# Patient Record
Sex: FEMALE | Race: WHITE | NOT HISPANIC OR LATINO | Employment: UNEMPLOYED | URBAN - NONMETROPOLITAN AREA
[De-identification: names, ages, dates, MRNs, and addresses within clinical notes are randomized per-mention and may not be internally consistent; named-entity substitution may affect disease eponyms.]

---

## 2017-10-02 ENCOUNTER — OFFICE VISIT (OUTPATIENT)
Dept: NEUROSURGERY | Facility: CLINIC | Age: 42
End: 2017-10-02

## 2017-10-02 ENCOUNTER — HOSPITAL ENCOUNTER (OUTPATIENT)
Dept: GENERAL RADIOLOGY | Facility: HOSPITAL | Age: 42
Discharge: HOME | End: 2017-10-02
Admitting: NURSE PRACTITIONER

## 2017-10-02 VITALS
BODY MASS INDEX: 34.55 KG/M2 | HEIGHT: 66 IN | DIASTOLIC BLOOD PRESSURE: 108 MMHG | SYSTOLIC BLOOD PRESSURE: 152 MMHG | WEIGHT: 215 LBS

## 2017-10-02 DIAGNOSIS — M50.30 DEGENERATION OF CERVICAL INTERVERTEBRAL DISC: ICD-10-CM

## 2017-10-02 DIAGNOSIS — M54.6 PAIN IN THORACIC SPINE: ICD-10-CM

## 2017-10-02 DIAGNOSIS — M43.16 SPONDYLOLISTHESIS, LUMBAR REGION: Primary | ICD-10-CM

## 2017-10-02 DIAGNOSIS — F17.200 SMOKER: ICD-10-CM

## 2017-10-02 PROCEDURE — 72052 X-RAY EXAM NECK SPINE 6/>VWS: CPT

## 2017-10-02 PROCEDURE — 72120 X-RAY BEND ONLY L-S SPINE: CPT

## 2017-10-02 PROCEDURE — 99204 OFFICE O/P NEW MOD 45 MIN: CPT | Performed by: NURSE PRACTITIONER

## 2017-10-02 RX ORDER — HYDROCODONE BITARTRATE AND ACETAMINOPHEN 10; 325 MG/1; MG/1
TABLET ORAL
COMMUNITY

## 2017-10-02 RX ORDER — DIAZEPAM 10 MG/1
TABLET ORAL
COMMUNITY

## 2017-10-02 RX ORDER — TRAMADOL HYDROCHLORIDE 50 MG/1
TABLET ORAL
COMMUNITY
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
## 2021-04-14 ENCOUNTER — OFFICE VISIT (OUTPATIENT)
Dept: NEUROSURGERY | Age: 46
End: 2021-04-14
Payer: MEDICAID

## 2021-04-14 VITALS
SYSTOLIC BLOOD PRESSURE: 112 MMHG | WEIGHT: 197 LBS | OXYGEN SATURATION: 97 % | DIASTOLIC BLOOD PRESSURE: 62 MMHG | BODY MASS INDEX: 34.91 KG/M2 | HEIGHT: 63 IN | HEART RATE: 103 BPM

## 2021-04-14 DIAGNOSIS — R53.1 WEAKNESS: ICD-10-CM

## 2021-04-14 DIAGNOSIS — R55 SYNCOPE, UNSPECIFIED SYNCOPE TYPE: ICD-10-CM

## 2021-04-14 DIAGNOSIS — M54.41 CHRONIC BILATERAL LOW BACK PAIN WITH BILATERAL SCIATICA: Primary | ICD-10-CM

## 2021-04-14 DIAGNOSIS — M54.42 CHRONIC BILATERAL LOW BACK PAIN WITH BILATERAL SCIATICA: Primary | ICD-10-CM

## 2021-04-14 DIAGNOSIS — G89.29 CHRONIC BILATERAL LOW BACK PAIN WITH BILATERAL SCIATICA: Primary | ICD-10-CM

## 2021-04-14 DIAGNOSIS — R27.0 ATAXIA: ICD-10-CM

## 2021-04-14 DIAGNOSIS — R51.9 HEADACHE, UNSPECIFIED HEADACHE TYPE: ICD-10-CM

## 2021-04-14 DIAGNOSIS — R20.0 NUMBNESS: ICD-10-CM

## 2021-04-14 PROCEDURE — G8417 CALC BMI ABV UP PARAM F/U: HCPCS | Performed by: PSYCHIATRY & NEUROLOGY

## 2021-04-14 PROCEDURE — 4004F PT TOBACCO SCREEN RCVD TLK: CPT | Performed by: PSYCHIATRY & NEUROLOGY

## 2021-04-14 PROCEDURE — G8427 DOCREV CUR MEDS BY ELIG CLIN: HCPCS | Performed by: PSYCHIATRY & NEUROLOGY

## 2021-04-14 PROCEDURE — 99205 OFFICE O/P NEW HI 60 MIN: CPT | Performed by: PSYCHIATRY & NEUROLOGY

## 2021-04-14 RX ORDER — FUROSEMIDE 20 MG/1
1 TABLET ORAL DAILY
COMMUNITY
Start: 2021-04-10

## 2021-04-14 RX ORDER — CARIPRAZINE 3 MG/1
1 CAPSULE, GELATIN COATED ORAL DAILY
COMMUNITY
Start: 2021-04-10
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
## 2021-04-15 ENCOUNTER — HOSPITAL ENCOUNTER (OUTPATIENT)
Dept: NON INVASIVE DIAGNOSTICS | Age: 46
Discharge: HOME | End: 2021-04-15
Payer: MEDICAID

## 2021-04-15 DIAGNOSIS — M54.41 CHRONIC BILATERAL LOW BACK PAIN WITH BILATERAL SCIATICA: ICD-10-CM

## 2021-04-15 DIAGNOSIS — R27.0 ATAXIA: ICD-10-CM

## 2021-04-15 DIAGNOSIS — R51.9 HEADACHE, UNSPECIFIED HEADACHE TYPE: ICD-10-CM

## 2021-04-15 DIAGNOSIS — R20.0 NUMBNESS: ICD-10-CM

## 2021-04-15 DIAGNOSIS — M54.42 CHRONIC BILATERAL LOW BACK PAIN WITH BILATERAL SCIATICA: ICD-10-CM

## 2021-04-15 DIAGNOSIS — G89.29 CHRONIC BILATERAL LOW BACK PAIN WITH BILATERAL SCIATICA: ICD-10-CM

## 2021-04-15 DIAGNOSIS — R55 SYNCOPE, UNSPECIFIED SYNCOPE TYPE: ICD-10-CM

## 2021-04-15 DIAGNOSIS — R53.1 WEAKNESS: ICD-10-CM

## 2021-04-15 LAB
ALBUMIN SERPL-MCNC: 4.1 G/DL (ref 3.5–5.2)
ALP BLD-CCNC: 113 U/L (ref 35–104)
ALT SERPL-CCNC: 31 U/L (ref 5–33)
ANION GAP SERPL CALCULATED.3IONS-SCNC: 10 MMOL/L (ref 7–19)
AST SERPL-CCNC: 23 U/L (ref 5–32)
BASOPHILS ABSOLUTE: 0.1 K/UL (ref 0–0.2)
BASOPHILS RELATIVE PERCENT: 0.5 % (ref 0–1)
BILIRUB SERPL-MCNC: <0.2 MG/DL (ref 0.2–1.2)
BUN BLDV-MCNC: 10 MG/DL (ref 6–20)
C-REACTIVE PROTEIN: 1.22 MG/DL (ref 0–0.5)
CALCIUM SERPL-MCNC: 9.9 MG/DL (ref 8.6–10)
CHLORIDE BLD-SCNC: 100 MMOL/L (ref 98–111)
CO2: 29 MMOL/L (ref 22–29)
CREAT SERPL-MCNC: 0.7 MG/DL (ref 0.5–0.9)
EOSINOPHILS ABSOLUTE: 0.2 K/UL (ref 0–0.6)
EOSINOPHILS RELATIVE PERCENT: 2.4 % (ref 0–5)
GFR AFRICAN AMERICAN: >59
GFR NON-AFRICAN AMERICAN: >60
GLUCOSE BLD-MCNC: 92 MG/DL (ref 74–109)
HCT VFR BLD CALC: 45.3 % (ref 37–47)
HEMOGLOBIN: 13.8 G/DL (ref 12–16)
HIV-1 P24 AG: NORMAL
IMMATURE GRANULOCYTES #: 0.1 K/UL
LYMPHOCYTES ABSOLUTE: 2.6 K/UL (ref 1.1–4.5)
LYMPHOCYTES RELATIVE PERCENT: 26.2 % (ref 20–40)
MCH RBC QN AUTO: 28 PG (ref 27–31)
MCHC RBC AUTO-ENTMCNC: 30.5 G/DL (ref 33–37)
MCV RBC AUTO: 92.1 FL (ref 81–99)
MONOCYTES ABSOLUTE: 0.6 K/UL (ref 0–0.9)
MONOCYTES RELATIVE PERCENT: 6.4 % (ref 0–10)
NEUTROPHILS ABSOLUTE: 6.4 K/UL (ref 1.5–7.5)
NEUTROPHILS RELATIVE PERCENT: 63.9 % (ref 50–65)
PDW BLD-RTO: 16.4 % (ref 11.5–14.5)
PLATELET # BLD: 251 K/UL (ref 130–400)
PMV BLD AUTO: 11.1 FL (ref 9.4–12.3)
POTASSIUM SERPL-SCNC: 4.4 MMOL/L (ref 3.5–5)
RAPID HIV 1&2: NORMAL
RBC # BLD: 4.92 M/UL (ref 4.2–5.4)
RHEUMATOID FACTOR: <10 IU/ML
SEDIMENTATION RATE, ERYTHROCYTE: 6 MM/HR (ref 0–20)
SODIUM BLD-SCNC: 139 MMOL/L (ref 136–145)
T4 FREE: 1.1 NG/DL (ref 0.93–1.7)
TOTAL CK: 89 U/L (ref 26–192)
TOTAL PROTEIN: 6.9 G/DL (ref 6.6–8.7)
TSH SERPL DL<=0.05 MIU/L-ACNC: 3.46 UIU/ML (ref 0.27–4.2)
VITAMIN B-12: 526 PG/ML (ref 211–946)
WBC # BLD: 10.1 K/UL (ref 4.8–10.8)

## 2021-04-15 PROCEDURE — 93225 XTRNL ECG REC<48 HRS REC: CPT

## 2021-04-15 PROCEDURE — 93226 XTRNL ECG REC<48 HR SCAN A/R: CPT

## 2021-04-18 LAB
ANA IGG, ELISA: NORMAL
LYME (B. BURGDORFERI) AB IGG WB: NEGATIVE
LYME AB IGM BY WB:: NEGATIVE

## 2021-04-19 LAB
ALBUMIN SERPL-MCNC: 3.73 G/DL (ref 3.75–5.01)
ALPHA-1-GLOBULIN: 0.34 G/DL (ref 0.19–0.46)
ALPHA-2-GLOBULIN: 0.77 G/DL (ref 0.48–1.05)
ARSENIC BLOOD: <10 UG/L
BETA GLOBULIN: 0.84 G/DL (ref 0.48–1.1)
GAMMA GLOBULIN: 0.72 G/DL (ref 0.62–1.51)
LEAD LEVEL BLOOD: <2 UG/DL
MERCURY BLOOD: <2.5 UG/L
PROTEIN ELECTROPHORESIS, SERUM: ABNORMAL
RPR: NORMAL
SPE/IFE INTERPRETATION: ABNORMAL
TOTAL PROTEIN: 6.4 G/DL (ref 6.3–8.2)

## 2021-04-28 ENCOUNTER — HOSPITAL ENCOUNTER (OUTPATIENT)
Dept: VASCULAR LAB | Age: 46
Discharge: HOME | End: 2021-04-28
Payer: MEDICAID

## 2021-04-28 ENCOUNTER — HOSPITAL ENCOUNTER (OUTPATIENT)
Dept: NON INVASIVE DIAGNOSTICS | Age: 46
Discharge: HOME | End: 2021-04-28
Payer: MEDICAID

## 2021-04-28 DIAGNOSIS — R20.0 NUMBNESS: ICD-10-CM

## 2021-04-28 DIAGNOSIS — R53.1 WEAKNESS: ICD-10-CM

## 2021-04-28 DIAGNOSIS — R55 SYNCOPE, UNSPECIFIED SYNCOPE TYPE: ICD-10-CM

## 2021-04-28 LAB
LV EF: 60 %
LVEF MODALITY: NORMAL

## 2021-04-28 PROCEDURE — 93306 TTE W/DOPPLER COMPLETE: CPT

## 2021-04-28 PROCEDURE — 93880 EXTRACRANIAL BILAT STUDY: CPT

## 2021-04-29 ENCOUNTER — HOSPITAL ENCOUNTER (OUTPATIENT)
Dept: NEUROLOGY | Age: 46
Discharge: HOME | End: 2021-04-29
Payer: MEDICAID

## 2021-04-29 DIAGNOSIS — R53.1 WEAKNESS: ICD-10-CM

## 2021-04-29 DIAGNOSIS — R51.9 HEADACHE, UNSPECIFIED HEADACHE TYPE: ICD-10-CM

## 2021-04-29 DIAGNOSIS — M54.41 CHRONIC BILATERAL LOW BACK PAIN WITH BILATERAL SCIATICA: ICD-10-CM

## 2021-04-29 DIAGNOSIS — G89.29 CHRONIC BILATERAL LOW BACK PAIN WITH BILATERAL SCIATICA: ICD-10-CM

## 2021-04-29 DIAGNOSIS — R27.0 ATAXIA: ICD-10-CM

## 2021-04-29 DIAGNOSIS — M54.42 CHRONIC BILATERAL LOW BACK PAIN WITH BILATERAL SCIATICA: ICD-10-CM

## 2021-04-29 DIAGNOSIS — R55 SYNCOPE, UNSPECIFIED SYNCOPE TYPE: ICD-10-CM

## 2021-04-29 DIAGNOSIS — R20.0 NUMBNESS: ICD-10-CM

## 2021-04-29 PROCEDURE — 95913 NRV CNDJ TEST 13/> STUDIES: CPT | Performed by: PSYCHIATRY & NEUROLOGY

## 2021-04-29 PROCEDURE — 95913 NRV CNDJ TEST 13/> STUDIES: CPT

## 2021-04-29 PROCEDURE — 95886 MUSC TEST DONE W/N TEST COMP: CPT

## 2021-04-29 PROCEDURE — 95886 MUSC TEST DONE W/N TEST COMP: CPT | Performed by: PSYCHIATRY & NEUROLOGY

## 2021-05-04 ENCOUNTER — HOSPITAL ENCOUNTER (OUTPATIENT)
Dept: MRI IMAGING | Age: 46
Discharge: HOME | End: 2021-05-04
Payer: MEDICAID

## 2021-05-04 ENCOUNTER — TELEPHONE (OUTPATIENT)
Dept: NEUROSURGERY | Age: 46
End: 2021-05-04

## 2021-05-04 DIAGNOSIS — M54.41 CHRONIC BILATERAL LOW BACK PAIN WITH BILATERAL SCIATICA: ICD-10-CM

## 2021-05-04 DIAGNOSIS — M54.42 CHRONIC BILATERAL LOW BACK PAIN WITH BILATERAL SCIATICA: ICD-10-CM

## 2021-05-04 DIAGNOSIS — R53.1 WEAKNESS: ICD-10-CM

## 2021-05-04 DIAGNOSIS — G89.29 CHRONIC BILATERAL LOW BACK PAIN WITH BILATERAL SCIATICA: ICD-10-CM

## 2021-05-04 DIAGNOSIS — R20.0 NUMBNESS: ICD-10-CM

## 2021-05-04 PROCEDURE — 72146 MRI CHEST SPINE W/O DYE: CPT

## 2021-05-04 NOTE — TELEPHONE ENCOUNTER
Patient needs a nurse to called ,Patient had issue today with her MRI because of her pain, Patient has appointment for MRI 5-5-21, Patient asking for stronger medication,please call the patient

## 2021-05-05 ENCOUNTER — TELEPHONE (OUTPATIENT)
Dept: NEUROSURGERY | Age: 46
End: 2021-05-05

## 2021-05-05 NOTE — TELEPHONE ENCOUNTER
Called patient back to let her know that since she already takes Valium 10 mg and Norco that he would not send anything in stronger for her MRI. That he sends in a lower dose of medication that she is currently taking.

## 2021-05-27 ENCOUNTER — HOSPITAL ENCOUNTER (OUTPATIENT)
Dept: MRI IMAGING | Age: 46
Discharge: HOME | End: 2021-05-27
Payer: MEDICAID

## 2021-05-27 PROCEDURE — 72148 MRI LUMBAR SPINE W/O DYE: CPT

## 2021-06-01 ENCOUNTER — HOSPITAL ENCOUNTER (OUTPATIENT)
Dept: MRI IMAGING | Age: 46
Discharge: HOME | End: 2021-06-01
Payer: MEDICAID

## 2021-06-01 DIAGNOSIS — R51.9 HEADACHE, UNSPECIFIED HEADACHE TYPE: ICD-10-CM

## 2021-06-01 PROCEDURE — A9577 INJ MULTIHANCE: HCPCS | Performed by: PSYCHIATRY & NEUROLOGY

## 2021-06-01 PROCEDURE — 70553 MRI BRAIN STEM W/O & W/DYE: CPT

## 2021-06-01 PROCEDURE — 6360000004 HC RX CONTRAST MEDICATION: Performed by: PSYCHIATRY & NEUROLOGY

## 2021-06-01 RX ADMIN — GADOBENATE DIMEGLUMINE 20 ML: 529 INJECTION, SOLUTION INTRAVENOUS at 07:18

## 2021-06-07 ENCOUNTER — HOSPITAL ENCOUNTER (OUTPATIENT)
Dept: NEUROLOGY | Age: 46
Discharge: HOME | End: 2021-06-07
Payer: MEDICAID

## 2021-06-07 DIAGNOSIS — R55 SYNCOPE, UNSPECIFIED SYNCOPE TYPE: ICD-10-CM

## 2021-06-07 DIAGNOSIS — M54.42 CHRONIC BILATERAL LOW BACK PAIN WITH BILATERAL SCIATICA: ICD-10-CM

## 2021-06-07 DIAGNOSIS — M54.41 CHRONIC BILATERAL LOW BACK PAIN WITH BILATERAL SCIATICA: ICD-10-CM

## 2021-06-07 DIAGNOSIS — R53.1 WEAKNESS: ICD-10-CM

## 2021-06-07 DIAGNOSIS — G89.29 CHRONIC BILATERAL LOW BACK PAIN WITH BILATERAL SCIATICA: ICD-10-CM

## 2021-06-07 DIAGNOSIS — R27.0 ATAXIA: ICD-10-CM

## 2021-06-07 DIAGNOSIS — R51.9 HEADACHE, UNSPECIFIED HEADACHE TYPE: ICD-10-CM

## 2021-06-07 DIAGNOSIS — R20.0 NUMBNESS: ICD-10-CM

## 2021-06-07 PROCEDURE — 95816 EEG AWAKE AND DROWSY: CPT | Performed by: PSYCHIATRY & NEUROLOGY

## 2021-06-07 PROCEDURE — 95816 EEG AWAKE AND DROWSY: CPT

## 2021-06-08 NOTE — PROCEDURES
ADULT OUTPATIENT ELECTROENCEPHALOGRAM REPORT    Patient:   Jw Wilkinson  MR#:    [id number]  YOB: 1975  Date of Evaluation:  6/7/2021  Primary Physician:     HILDA Gee CNP   Referring Physician:   Romayne Pont, DO      CLINICAL INFORMATION:     This patient is a 39 y.o. female with a history of syncope. MEDICATIONS:     See MAR. RECORDING CONDITIONS:     This EEG was performed utilizing standard International 10-20 System of electrode placement, with additional channels monitored for eye movement. One channel electrocardiogram was monitored. Data was obtained, stored, and interpreted according to ACNS guidelines (J Clin Neurophysiol 2006;23(2):) utilizing referential montage recording, with reformatting to longitudinal, transverse bipolar, and referential montages as necessary for interpretation, along with the digital/automated EEG analysis. Patient tolerated entire procedure well. Photic stimulation and hyperventilation were utilized as activation procedures unless otherwise specified below. E.E.G. DESCRIPTION:     Excessive beta activity was noted throughout the recording. No overt focal, lateralizing, or paroxysmal abnormalities were noted through the recording. Drowsiness and sleep were not demonstrated. Hyperventilation was not performed. Photic stimulation was performed and had little change on the recording. No ECG abnormalities were noted. Muscle, motion, and eye movement artifacts were noted. EEG INTERPRETATION:    Normal EEG for age in the awake state. The excessive beta activity seen during this recording is likely pharmacologically induced. CLINICAL CORRELATION:     The absence of epileptiform abnormalities does not preclude a clinical diagnosis of seizures.        Romayne Pont, DO  Board Certified Neurologist      Date reported: 6/7/2021  Date signed: 6/7/2021

## 2021-06-14 ENCOUNTER — OFFICE VISIT (OUTPATIENT)
Dept: NEUROSURGERY | Age: 46
End: 2021-06-14
Payer: MEDICAID

## 2021-06-14 ENCOUNTER — TELEPHONE (OUTPATIENT)
Dept: NEUROSURGERY | Age: 46
End: 2021-06-14

## 2021-06-14 VITALS
SYSTOLIC BLOOD PRESSURE: 122 MMHG | DIASTOLIC BLOOD PRESSURE: 78 MMHG | WEIGHT: 197 LBS | HEIGHT: 63 IN | OXYGEN SATURATION: 98 % | BODY MASS INDEX: 34.91 KG/M2 | HEART RATE: 105 BPM

## 2021-06-14 DIAGNOSIS — M54.41 CHRONIC BILATERAL LOW BACK PAIN WITH BILATERAL SCIATICA: ICD-10-CM

## 2021-06-14 DIAGNOSIS — R20.0 NUMBNESS: ICD-10-CM

## 2021-06-14 DIAGNOSIS — G89.29 CHRONIC BILATERAL LOW BACK PAIN WITH BILATERAL SCIATICA: ICD-10-CM

## 2021-06-14 DIAGNOSIS — M54.42 CHRONIC BILATERAL LOW BACK PAIN WITH BILATERAL SCIATICA: ICD-10-CM

## 2021-06-14 DIAGNOSIS — R90.89 ABNORMAL FINDING ON MRI OF BRAIN: Primary | ICD-10-CM

## 2021-06-14 PROCEDURE — 4004F PT TOBACCO SCREEN RCVD TLK: CPT | Performed by: NURSE PRACTITIONER

## 2021-06-14 PROCEDURE — G8417 CALC BMI ABV UP PARAM F/U: HCPCS | Performed by: NURSE PRACTITIONER

## 2021-06-14 PROCEDURE — G8427 DOCREV CUR MEDS BY ELIG CLIN: HCPCS | Performed by: NURSE PRACTITIONER

## 2021-06-14 PROCEDURE — 99214 OFFICE O/P EST MOD 30 MIN: CPT | Performed by: NURSE PRACTITIONER

## 2021-06-14 NOTE — TELEPHONE ENCOUNTER
[facility] New Patient Questionnaire    1. Diagnosis/Reason for Referral?    Chronic bilateral low back pain with bilateral sciatica  R20.0 (ICD-10-CM) - Numbness    2. Who is completing questionnaire? Patient x Caregiver Family      3. Has the patient had any previous spinal/brain surgeries? yes      A. If yes, what is the name of the facility in which the surgery was performed? [facility]. B. Procedure/Surgery performed?    l4 l5 fusion   C. Who was the surgeon? Pt does not know     D. When was the surgery? 2008       E. Did the patient improve after the surgery? 4. Is this a second opinion? If yes, Dr. Peter England would like to review patient first before making the appointment. 5. Have MRI Images been obtain within the last year? Yes x No      XR  CT     If yes, where was the imaging performed? If yes, what part of the body? Lumbar x Cervical  Thoracicx  Brainx     If yes, when was it obtained? 05/04/21, 05-27-21, 06-01-21  Note: if the scan was performed at a facility other than [facility], the disc will need to be brought to the appointment or we need to reach out to obtain the disc. A. Was the patient instructed to provide the disc? Yes   [facility]      8. Has the patient had a NCV/EMG within the last year? Yes x No     If yes, where was it performed and date? 4-30-21 Location: [facility]      9. Has the patient been to Physical Therapy? Yes  No x     If yes, what location, how long attended, and last visit? Location:        Therapy Lasted:    Date of Last Visit:      10. Has the patient been to Pain Management? Yes  No x     If yes, what location and last visit     Location:   Last Visit:   Is it helping?
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
## 2021-06-14 NOTE — PROGRESS NOTES
REVIEW OF SYSTEMS    Constitutional: []Fever []Sweat []Chills [] Recent Injury [x] Denies all unless marked  HEENT:[]Headache  [] Head Injury/Hearing Loss  [] Sore Throat  [] Ear Ache/Dizziness  [x] Denies all unless marked  Spine:  [x] Neck pain  [x] Back pain  [] Sciaticia  [x] Denies all unless marked  Cardiovascular:[]Heart Disease []Chest Pain [] Palpitations  [x] Denies all unless marked  Pulmonary: []Shortness of Breath []Cough   [x] Denies all unless marke  Gastrointestinal: []Nausea  []Vomiting  []Abdominal Pain  []Constipation  []Diarrhea  []Dark Bloody Stools  [x] Denies all unless marked  Psychiatric/Behavioral:[] Depression [] Anxiety [x] Denies all unless marked  Genitourinary:   [] Frequency  [] Urgency  [] Incontinence [] Pain with Urination  [x] Denies all unless marked  Extremities: [x]Pain  []Swelling  [x] Denies all unless marked  Musculoskeletal: [x] Muscle Pain  [] Joint Pain  [] Arthritis [] Muscle Cramps [] Muscle Twitches  [x] Denies all unless marked  Sleep: [] Insomnia [] Snoring [] Restless Legs [] Sleep Apnea  [] Daytime Sleepiness  [x] Denies all unless marked  Skin:[] Rash [] Skin Discoloration [x] Denies all unless marked   Neurological: []Visual Disturbance/Memory Loss [] Loss of Balance [] Slurred Speech/Weakness [] Seizures  [] Vertigo/Dizziness [x] Denies all unless marked

## 2021-07-01 ENCOUNTER — HOSPITAL ENCOUNTER (OUTPATIENT)
Dept: GENERAL RADIOLOGY | Age: 46
Discharge: HOME | End: 2021-07-01
Payer: MEDICAID

## 2021-07-01 ENCOUNTER — TELEPHONE (OUTPATIENT)
Dept: NEUROSURGERY | Age: 46
End: 2021-07-01

## 2021-07-01 ENCOUNTER — OFFICE VISIT (OUTPATIENT)
Dept: NEUROSURGERY | Age: 46
End: 2021-07-01
Payer: MEDICAID

## 2021-07-01 VITALS
DIASTOLIC BLOOD PRESSURE: 89 MMHG | HEART RATE: 91 BPM | WEIGHT: 280 LBS | BODY MASS INDEX: 46.65 KG/M2 | HEIGHT: 65 IN | SYSTOLIC BLOOD PRESSURE: 134 MMHG

## 2021-07-01 DIAGNOSIS — G89.29 CHRONIC MIDLINE LOW BACK PAIN WITH LEFT-SIDED SCIATICA: ICD-10-CM

## 2021-07-01 DIAGNOSIS — M48.061 LUMBAR FORAMINAL STENOSIS: ICD-10-CM

## 2021-07-01 DIAGNOSIS — R26.89 ANTALGIC GAIT: ICD-10-CM

## 2021-07-01 DIAGNOSIS — Z98.1 HISTORY OF FUSION OF CERVICAL SPINE: ICD-10-CM

## 2021-07-01 DIAGNOSIS — M43.17 SPONDYLOLISTHESIS AT L5-S1 LEVEL: ICD-10-CM

## 2021-07-01 DIAGNOSIS — M54.2 NECK PAIN: ICD-10-CM

## 2021-07-01 DIAGNOSIS — M43.17 SPONDYLOLISTHESIS AT L5-S1 LEVEL: Primary | ICD-10-CM

## 2021-07-01 DIAGNOSIS — M48.02 FORAMINAL STENOSIS OF CERVICAL REGION: ICD-10-CM

## 2021-07-01 DIAGNOSIS — M51.37 DDD (DEGENERATIVE DISC DISEASE), LUMBOSACRAL: ICD-10-CM

## 2021-07-01 DIAGNOSIS — M43.06 LUMBAR PARS DEFECT: ICD-10-CM

## 2021-07-01 DIAGNOSIS — M54.42 CHRONIC MIDLINE LOW BACK PAIN WITH LEFT-SIDED SCIATICA: ICD-10-CM

## 2021-07-01 PROCEDURE — 99214 OFFICE O/P EST MOD 30 MIN: CPT | Performed by: NURSE PRACTITIONER

## 2021-07-01 PROCEDURE — 4004F PT TOBACCO SCREEN RCVD TLK: CPT | Performed by: NURSE PRACTITIONER

## 2021-07-01 PROCEDURE — 72110 X-RAY EXAM L-2 SPINE 4/>VWS: CPT

## 2021-07-01 PROCEDURE — G8427 DOCREV CUR MEDS BY ELIG CLIN: HCPCS | Performed by: NURSE PRACTITIONER

## 2021-07-01 PROCEDURE — G8417 CALC BMI ABV UP PARAM F/U: HCPCS | Performed by: NURSE PRACTITIONER

## 2021-07-01 RX ORDER — TIZANIDINE 4 MG/1
4 TABLET ORAL 3 TIMES DAILY PRN
Qty: 90 TABLET | Refills: 0 | Status: SHIPPED | OUTPATIENT
Start: 2021-07-01 | End: 2021-08-02

## 2021-07-01 ASSESSMENT — ENCOUNTER SYMPTOMS
GASTROINTESTINAL NEGATIVE: 1
RESPIRATORY NEGATIVE: 1
BACK PAIN: 1
EYES NEGATIVE: 1

## 2021-07-01 NOTE — PROGRESS NOTES
odd)  Physical Therapy (does not help - makes her pain worse)  [facility] therapy makes her pain better   States she does not want injections with pain management due to steroids  Massage therapy does help some - cannot afford       Of note she does use tobacco and does not take blood thinning medications. Past Medical History:   Diagnosis Date    ADHD (attention deficit hyperactivity disorder)     Anxiety     B12 deficiency     Chronic neck pain     Headache     Insomnia     Neuropathic pain     Panic disorder     Seizures (HCC)     felt due to medications, aspirin       Past Surgical History:   Procedure Laterality Date    CERVICAL FUSION      c4-c5     SECTION      TONSILLECTOMY         Current Outpatient Medications   Medication Sig Dispense Refill    tiZANidine (ZANAFLEX) 4 MG tablet Take 1 tablet by mouth 3 times daily as needed (muscle spasms) 90 tablet 0    VRAYLAR 3 MG CAPS capsule Take 1 capsule by mouth daily      dilTIAZem (CARDIZEM) 30 MG tablet Take 1 tablet by mouth daily      furosemide (LASIX) 20 MG tablet Take 1 tablet by mouth daily      promethazine-codeine (PHENERGAN WITH CODEINE) 6.25-10 MG/5ML syrup 4 times daily as needed       pregabalin (LYRICA) 200 MG capsule Take 200 mg by mouth 3 times daily      HYDROcodone-acetaminophen (NORCO)  MG per tablet Take 1 tablet by mouth every 6 hours as needed for Pain       Lisdexamfetamine Dimesylate (VYVANSE) 60 MG CAPS Take by mouth      diazepam (VALIUM) 10 MG tablet Take 10 mg by mouth every 6 hours as needed for Anxiety       No current facility-administered medications for this visit.        Allergies:  Aspirin, Nsaids, and Other    Social History:   Social History     Tobacco Use   Smoking Status Heavy Tobacco Smoker    Packs/day: 0.50    Years: 23.00    Pack years: 11.50    Types: Cigarettes   Smokeless Tobacco Never Used     Social History     Substance and Sexual Activity   Alcohol Use No Family History:   Family History   Problem Relation Age of Onset    Cancer Mother     Cancer Father     High Blood Pressure Maternal Aunt     Heart Disease Maternal Aunt     Cancer Maternal Grandmother     Cancer Maternal Grandfather     Stroke Maternal Grandfather     Diabetes Maternal Grandfather     Cancer Paternal Grandmother     Cancer Paternal Grandfather        REVIEW OF SYSTEMS:  Constitutional: Negative. HENT: Negative. Eyes: Negative. Respiratory: Negative. Cardiovascular: Negative. Gastrointestinal: Negative. Genitourinary: Negative. Musculoskeletal: Positive for back pain and myalgias. Skin: Negative. Neurological: Negative. Endo/Heme/Allergies: Negative. Psychiatric/Behavioral: Negative. PHYSICAL EXAM:  Vitals:    07/01/21 1010   BP: 134/89   Pulse: 91     Constitutional: appears well-developed and well-nourished. Eyes  conjunctiva normal.  Pupils react to light  Ear, nose, throat - hearing intact to finger rub, No scars, masses, or lesions over external nose or ears, no atrophy oftongue  Neck- symmetric, no masses noted, no jugular vein distension  Respiration- chest wall appears symmetric, good expansion, normal effort without use of accessory muscles  Musculoskeletal  no significant wasting of muscles noted, no bony deformities, gait no gross ataxia  Extremities- no clubbing, cyanosis oredema  Skin  warm, dry, and intact. No rash, erythema, or pallor.   Psychiatric  mood, affect, and behavior appear normal.     Neurologic Examination  Awake, Alert and oriented x 4  Normal speech pattern, following commands    Motor:  RIGHT: hand grasp 5/5    finger extension 5/5    bicep 5/5    triceps 5/5    deltoid 5/5      iliopsoas 5/5    knee flexor 5/5    knee extension 5/5    EHL/dorsiflexion 5/5    plantar flexion 5/5    LEFT:   hand grasp 5/5    finger extension 5/5    bicep 5/5    triceps 5/5    deltoid 5/5      iliopsoas 5/5    knee flexor 5/5    knee extension 5/5    EHL/dorsiflexion 5/5    plantar flexion 5/5    No deficits to light touch or pinprick sensation  Reflexes are 2+ and symmetric  No clonus or Hoffmans sign  Moderate myofacial tenderness to palpation entire spine  Antalgic Gait pattern      DATA and IMAGING:    Nursing/pcp notes, imaging, labs, and vitals reviewed. PT,OT and/or speech notes reviewed    Lab Results   Component Value Date    WBC 10.1 04/15/2021    HGB 13.8 04/15/2021    HCT 45.3 04/15/2021    MCV 92.1 04/15/2021     04/15/2021     Lab Results   Component Value Date     04/15/2021    K 4.4 04/15/2021     04/15/2021    CO2 29 04/15/2021    BUN 10 04/15/2021    CREATININE 0.7 04/15/2021    GLUCOSE 92 04/15/2021    CALCIUM 9.9 04/15/2021    PROT 6.4 04/15/2021    PROT 6.9 04/15/2021    LABALBU 3.73 (L) 04/15/2021    LABALBU 4.1 04/15/2021    BILITOT <0.2 04/15/2021    ALKPHOS 113 (H) 04/15/2021    AST 23 04/15/2021    ALT 31 04/15/2021    LABGLOM >60 04/15/2021    GFRAA >59 04/15/2021   No results found for: INR, PROTIME        Narrative   EXAMINATION:  MRI THORACIC SPINE WO CONTRAST  5/4/2021 10:07 AM   HISTORY: Chronic back pain. Weakness and numbness. Remote trauma. COMPARISON: No comparison study. TECHNIQUE: Multiplanar imaging was performed in a high-field magnet   without contrast. The patient indicated the examination early. All of   the typical imaging sequences were not obtained. FINDINGS: The bone marrow is not fat replaced on the T1-weighted   images. No other bone marrow signal abnormality is seen. There is no   signal abnormality within the visualized thoracic spinal cord. There   is no evidence of acute fracture. There is disc narrowing and mild   disc bulging at multiple disc levels. At T6-7, there is a small right   paracentral disc protrusion producing mild dural sac compression but   no cord compression.  At T9-T10, there is a small left paracentral disc   protrusion producing minimal dural sac compression but no cord   compression.       Impression   1. Disc degeneration at multiple levels with mild disc bulging. Small   disc herniations at 2 levels, as described above. No cord compression   or cord edema. No spinal stenosis seen. 2. The bone marrow is not fat replaced. This could be due to cigarette   smoking or other causes of chronic anemia with bone marrow   stimulation. No pathologic-appearing bone marrow signal is seen. Signed by Dr Deb Sanchez on 5/4/2021 10:14 AM   I have personally reviewed the images and my interpretation is:  Some DDD mid thoracic spine, however, no significant stenosis or neural element compression         Narrative   EXAMINATION: MRI LUMBAR SPINE WO CONTRAST 5/27/2021 9:14 AM   HISTORY: Chronic low back pain. Report: Multiplanar multisequence MR imaging of the lumbar spine was   performed without contrast.   COMPARISON: There are no correlative imaging studies for comparison. Sagittal images demonstrate grade 1 anterolisthesis of L5 relative to   S1, with moderate disc space narrowing and mild type II Modic endplate   changes. There is mild disc space narrowing and disc desiccation at   L3-4. The conus tip is identified at the L1 level and appears   unremarkable. Sagittal STIR images demonstrate normal homogeneous   marrow signal. The axial images are reviewed level by level. L5-S1: Grade 1 spondylolisthesis with moderate disc space narrowing   and disc desiccation, there is broad-based bulging of the disc without   a focal disc herniation. There appear to be bilateral L5 pars defects. Mild bilateral neural foraminal narrowing is identified. There is   abundant epidural fat at this level, with mild congenital spinal   stenosis. L4-5: Mild bulging of the disc is present. There is no disc   herniation. The neural foramina are normally patent. There is abundant   epidural fat at this level, with mild congenital spinal stenosis.    L3-4: Mild disc space narrowing and partial disc desiccation, with   slight bulging of the disc. No disc herniation is identified. There is   normal patency of the neural foramina. There is mild congenital spinal   canal stenosis. L2-3: Unremarkable. L1-2: Unremarkable.       Impression   1. Advanced spondylosis L5-S1 with moderate disc space narrowing,   grade 1 spondylolisthesis with posterior uncovering of the disc and   disc bulging. There appear to be bilateral L5 pars defects. There is   mild to moderate bilateral neural foraminal narrowing at L5-S1 with no   focal HNP. 2. 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   3. The distal spinal cord appears unremarkable. Signed by Dr Mirtha Pham on 5/27/2021 9:21 AM   I have personally reviewed these images and my interpretation is:  DDD L4-5 L5-S1  Spondylolisthesis L5 on S1 that measures 6-7mm that results in mild to moderate bilateral foraminal stenosis. There appears to be a pars defect at L5      ASSESSMENT:    Candi Fish is a 39 y.o. female with a spondylolisthesis of L5 on S1 that complains of back pain. ICD-10-CM    1. Spondylolisthesis at L5-S1 level  M43.17 [facility], Derral Butter, APRN, Pain Medicine, [place]     XR LUMBAR SPINE (MIN 4 VIEWS)   2. Lumbar foraminal stenosis  M48.061 [address], [facility] Imelda Cannon APRN, Pain Medicine, [place]   3. DDD (degenerative disc disease), lumbosacral  M51.37 [address], [facility] , Lakisha Mac, APRN, Pain Medicine, [place]   4. Chronic midline low back pain with left-sided sciatica  M54.42 [facility] Paxton Muniz APRN, Pain Medicine, [place]    M62.71    5. Lumbar pars defect  M43.06 [facility], Derral Butter, APRN, Pain Medicine, [place]     XR LUMBAR SPINE (MIN 4 VIEWS)   6. Antalgic gait  [address] [place],[address] - Or[facility] Dry, Derral Butter, APRN, Pain Medicine, [place]   7. Neck pain  M54.2 MRI CERVICAL SPINE W WO CONTRAST   8.  Foraminal stenosis of cervical region  M48.02 MRI CERVICAL SPINE W WO CONTRAST   9. History of fusion of cervical spine  Z98.1 MRI CERVICAL SPINE W WO CONTRAST       PLAN:  We have discussed and reviewed the results of the MRI brain, thoracic, and lumbar spine with Ms. Jodie Pelayo and her significant other at length. We explained that she does not have any neural element compression of the thoracic spine. She does have a spondylolisthesis of L5 on S1 that does result in some narrowing. Some descriptions of her pain are rather mechanical.  We will obtain additional imaging to rule out instability. We discussed various non-operative treatments that include LESI.   We will move forward with imaging of her cervical spine due to the fact that she had significant narrowing on her imaging dated back in 2016.  -Start tizanidine    -Obtain MRI cervical spine   -Obtain XR lumbar spine to rule out instability  -Follow up 6 weeks       HILDA Ibarra

## 2021-07-27 ENCOUNTER — HOSPITAL ENCOUNTER (OUTPATIENT)
Dept: MRI IMAGING | Age: 46
Discharge: HOME | End: 2021-07-27
Payer: MEDICAID

## 2021-07-27 DIAGNOSIS — M48.02 FORAMINAL STENOSIS OF CERVICAL REGION: ICD-10-CM

## 2021-07-27 DIAGNOSIS — Z98.1 HISTORY OF FUSION OF CERVICAL SPINE: ICD-10-CM

## 2021-07-27 DIAGNOSIS — M54.2 NECK PAIN: ICD-10-CM

## 2021-07-27 PROCEDURE — A9577 INJ MULTIHANCE: HCPCS | Performed by: NURSE PRACTITIONER

## 2021-07-27 PROCEDURE — 72156 MRI NECK SPINE W/O & W/DYE: CPT

## 2021-07-27 PROCEDURE — 6360000004 HC RX CONTRAST MEDICATION: Performed by: NURSE PRACTITIONER

## 2021-07-27 RX ADMIN — GADOBENATE DIMEGLUMINE 20 ML: 529 INJECTION, SOLUTION INTRAVENOUS at 15:18

## 2021-08-02 RX ORDER — TIZANIDINE 4 MG/1
4 TABLET ORAL 3 TIMES DAILY PRN
Qty: 90 TABLET | Refills: 0 | Status: SHIPPED | OUTPATIENT
Start: 2021-08-02 | End: 2021-09-01

## 2021-08-12 ENCOUNTER — TELEPHONE (OUTPATIENT)
Dept: NEUROLOGY | Age: 46
End: 2021-08-12

## 2021-08-12 RX ORDER — UBROGEPANT 100 MG/1
TABLET ORAL
Qty: 10 TABLET | Refills: 3 | Status: SHIPPED | OUTPATIENT
Start: 2021-08-12 | End: 2021-09-15 | Stop reason: SDUPTHER

## 2021-08-12 NOTE — TELEPHONE ENCOUNTER
Called patient back to advise [facility] and that the dermatology referral would need to come from her PCP. Left detailed voicemail.

## 2021-08-12 NOTE — TELEPHONE ENCOUNTER
Patient called requesting a prescription for Ubrelvy for her headaches. She spoke to her pcp about this and was told to ask you. She is also requesting a referral to dermatology. I told her this might need to come from her pcp, but she insisted you talked about it during her visit.

## 2021-08-12 NOTE — TELEPHONE ENCOUNTER
Will send Ronen Baudiliodomi to pharmacy. I don't think dermatology offices require a referral. If they do it needs to come from PCP.

## 2021-09-01 RX ORDER — TIZANIDINE 4 MG/1
4 TABLET ORAL 3 TIMES DAILY PRN
Qty: 90 TABLET | Refills: 0 | Status: SHIPPED | OUTPATIENT
Start: 2021-09-01 | End: 2021-09-28

## 2021-09-01 NOTE — TELEPHONE ENCOUNTER
Requested Prescriptions     Pending Prescriptions Disp Refills    tiZANidine (ZANAFLEX) 4 MG tablet [Pharmacy Med Name: TIZANIDINE HYDROCHLORIDE 4MG TABLET] 90 tablet 0     Sig: TAKE 1 TABLET BY MOUTH 3 TIMES DAILY AS NEEDED (MUSCLE SPASMS)       Last Office Visit: 7/1/2021  Next Office Visit: 9/15/2021  Last Medication Refill: 8/2/21    Plan as of 7/1/21  -Start tizanidine    -Obtain MRI cervical spine   -Obtain XR lumbar spine to rule out instability  -Follow up 6 weeks

## 2021-09-14 ENCOUNTER — HOSPITAL ENCOUNTER (OUTPATIENT)
Dept: MRI IMAGING | Age: 46
Discharge: HOME | End: 2021-09-14
Payer: MEDICAID

## 2021-09-14 DIAGNOSIS — R90.89 ABNORMAL FINDING ON MRI OF BRAIN: ICD-10-CM

## 2021-09-14 PROCEDURE — 70553 MRI BRAIN STEM W/O & W/DYE: CPT

## 2021-09-14 PROCEDURE — A9577 INJ MULTIHANCE: HCPCS | Performed by: NURSE PRACTITIONER

## 2021-09-14 PROCEDURE — 6360000004 HC RX CONTRAST MEDICATION: Performed by: NURSE PRACTITIONER

## 2021-09-14 RX ADMIN — GADOBENATE DIMEGLUMINE 20 ML: 529 INJECTION, SOLUTION INTRAVENOUS at 15:20

## 2021-09-15 ENCOUNTER — OFFICE VISIT (OUTPATIENT)
Dept: NEUROSURGERY | Age: 46
End: 2021-09-15
Payer: MEDICAID

## 2021-09-15 VITALS
SYSTOLIC BLOOD PRESSURE: 132 MMHG | WEIGHT: 280 LBS | DIASTOLIC BLOOD PRESSURE: 90 MMHG | OXYGEN SATURATION: 98 % | BODY MASS INDEX: 47.8 KG/M2 | HEIGHT: 64 IN | HEART RATE: 104 BPM

## 2021-09-15 DIAGNOSIS — M54.2 NECK PAIN: Primary | ICD-10-CM

## 2021-09-15 DIAGNOSIS — R51.9 HEADACHE, UNSPECIFIED HEADACHE TYPE: ICD-10-CM

## 2021-09-15 DIAGNOSIS — G89.29 CHRONIC BILATERAL LOW BACK PAIN WITH BILATERAL SCIATICA: ICD-10-CM

## 2021-09-15 DIAGNOSIS — M54.42 CHRONIC BILATERAL LOW BACK PAIN WITH BILATERAL SCIATICA: ICD-10-CM

## 2021-09-15 DIAGNOSIS — M54.41 CHRONIC BILATERAL LOW BACK PAIN WITH BILATERAL SCIATICA: ICD-10-CM

## 2021-09-15 PROCEDURE — 4004F PT TOBACCO SCREEN RCVD TLK: CPT | Performed by: NURSE PRACTITIONER

## 2021-09-15 PROCEDURE — G8417 CALC BMI ABV UP PARAM F/U: HCPCS | Performed by: NURSE PRACTITIONER

## 2021-09-15 PROCEDURE — 99213 OFFICE O/P EST LOW 20 MIN: CPT | Performed by: NURSE PRACTITIONER

## 2021-09-15 PROCEDURE — G8427 DOCREV CUR MEDS BY ELIG CLIN: HCPCS | Performed by: NURSE PRACTITIONER

## 2021-09-15 RX ORDER — DOXEPIN HYDROCHLORIDE 25 MG/1
25 CAPSULE ORAL NIGHTLY
COMMUNITY

## 2021-09-15 RX ORDER — MECLIZINE HCL 12.5 MG/1
TABLET ORAL
COMMUNITY
End: 2021-09-20

## 2021-09-15 RX ORDER — IRBESARTAN 150 MG/1
TABLET ORAL
COMMUNITY

## 2021-09-15 RX ORDER — UBROGEPANT 100 MG/1
TABLET ORAL
Qty: 10 TABLET | Refills: 3 | Status: SHIPPED | OUTPATIENT
Start: 2021-09-15 | End: 2021-09-22

## 2021-09-15 RX ORDER — HYDROXYZINE 50 MG/1
TABLET, FILM COATED ORAL
COMMUNITY
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
## 2021-09-15 NOTE — PROGRESS NOTES

## 2021-09-20 ENCOUNTER — HOSPITAL ENCOUNTER (OUTPATIENT)
Dept: PAIN MANAGEMENT | Age: 46
Discharge: HOME | End: 2021-09-20
Payer: MEDICAID

## 2021-09-20 ENCOUNTER — TELEPHONE (OUTPATIENT)
Dept: NEUROSURGERY | Age: 46
End: 2021-09-20

## 2021-09-20 VITALS
WEIGHT: 293 LBS | BODY MASS INDEX: 47.09 KG/M2 | HEART RATE: 92 BPM | OXYGEN SATURATION: 100 % | DIASTOLIC BLOOD PRESSURE: 100 MMHG | HEIGHT: 66 IN | SYSTOLIC BLOOD PRESSURE: 158 MMHG | TEMPERATURE: 97 F

## 2021-09-20 PROCEDURE — 99215 OFFICE O/P EST HI 40 MIN: CPT

## 2021-09-20 ASSESSMENT — PAIN SCALES - GENERAL: PAINLEVEL_OUTOF10: 10

## 2021-09-20 ASSESSMENT — PAIN DESCRIPTION - LOCATION: LOCATION: BACK

## 2021-09-20 ASSESSMENT — PAIN DESCRIPTION - PAIN TYPE: TYPE: CHRONIC PAIN

## 2021-09-20 NOTE — TELEPHONE ENCOUNTER
Patient came into the office today wanting to let Nevaeh Reyes and Dr Jaymie Bunch know that she left from upstairs at pain management and could not get the injections due to her being allergic to the medication given in the injection. She voiced that the injections cause her legs and feet to go numb and she is unable to walk afterwards. She voiced that she would like for the providers to instruct her on what steps she can take from here since the injections will not work for her. Patient also voiced that she would like to make a follow up appointment with them to discuss her MRI cervical results. (Patient no showed her appt with our office on 8/31/2021)     I scheduled the patient with Dr Kaur Li on Tuesday 9/21/2021 at 10:30 am to review MRI results and xray. Patient voiced that date and time will work and she will see us then.

## 2021-09-20 NOTE — PROGRESS NOTES
Patient says that she allergic to Steroids. Makes her pain worse and changes her mental status to where she is angry and mad at everyone. Cannot have LESI.

## 2021-09-20 NOTE — PROGRESS NOTES
Clinic Documentation      Education Provided:  [x] Review of Kishor Donaldson  [x] Agreement Review  [x] PEG Score Calculated [x] PHQ Score Calculated [x] ORT Score Calculated    [] Compliance Issues Discussed [] Cognitive Behavior Needs [x] Exercise [] Review of Test [] Financial Issues  [x] Tobacco/Alcohol Use Reviewed [x] Teaching [x] New Patient [] Picture Obtained    Physician Plan:  [] Outgoing Referral  [] Pharmacy Consult  [] Test Ordered [] Prescription Ordered/Changed   [] Obtained Test Results / Consult Notes        Complete if patient is withholding blood thinner for procedure     Blood Thinner Patient is currently taking:      [] Plavix (Hold for 7 days)  [] Aspirin (Hold for 5 days)     [] Pletal (Hold for 2 days)  [] Pradaxa (Hold for 3 days)    [] Effient (Hold for 7 days)  [] Xarelto (Hold for 2 days)    [] Eliquis (Hold for 2 days)  [] Brilinta (Hold for 7 days)    [] Coumadin (Hold for 5 days) - (INR needs to be drawn the day prior to procedure- INR < 2.0)    [] Aggrenox (Hold for 7 days)        [] Patient will stop medication on their own.    [] Blood Thinner Form Faxed for approval to hold.    Provider form faxed to:   Assessment Completed by:  Electronically signed by Laura Newman on 9/20/2021 at 11:31 AM

## 2021-09-21 ENCOUNTER — OFFICE VISIT (OUTPATIENT)
Dept: NEUROSURGERY | Age: 46
End: 2021-09-21
Payer: MEDICAID

## 2021-09-21 VITALS
WEIGHT: 280 LBS | OXYGEN SATURATION: 96 % | DIASTOLIC BLOOD PRESSURE: 87 MMHG | HEART RATE: 101 BPM | SYSTOLIC BLOOD PRESSURE: 123 MMHG | HEIGHT: 66 IN | BODY MASS INDEX: 45 KG/M2

## 2021-09-21 DIAGNOSIS — Z98.1 HISTORY OF FUSION OF CERVICAL SPINE: ICD-10-CM

## 2021-09-21 DIAGNOSIS — M54.41 CHRONIC BILATERAL LOW BACK PAIN WITH BILATERAL SCIATICA: ICD-10-CM

## 2021-09-21 DIAGNOSIS — M51.37 DDD (DEGENERATIVE DISC DISEASE), LUMBOSACRAL: ICD-10-CM

## 2021-09-21 DIAGNOSIS — M43.17 SPONDYLOLISTHESIS AT L5-S1 LEVEL: ICD-10-CM

## 2021-09-21 DIAGNOSIS — M48.02 FORAMINAL STENOSIS OF CERVICAL REGION: Primary | ICD-10-CM

## 2021-09-21 DIAGNOSIS — M54.2 NECK PAIN: ICD-10-CM

## 2021-09-21 DIAGNOSIS — M48.061 LUMBAR FORAMINAL STENOSIS: ICD-10-CM

## 2021-09-21 DIAGNOSIS — G89.29 CHRONIC BILATERAL LOW BACK PAIN WITH BILATERAL SCIATICA: ICD-10-CM

## 2021-09-21 DIAGNOSIS — M54.42 CHRONIC BILATERAL LOW BACK PAIN WITH BILATERAL SCIATICA: ICD-10-CM

## 2021-09-21 PROCEDURE — G8417 CALC BMI ABV UP PARAM F/U: HCPCS | Performed by: NEUROLOGICAL SURGERY

## 2021-09-21 PROCEDURE — G8427 DOCREV CUR MEDS BY ELIG CLIN: HCPCS | Performed by: NEUROLOGICAL SURGERY

## 2021-09-21 PROCEDURE — 99214 OFFICE O/P EST MOD 30 MIN: CPT | Performed by: NEUROLOGICAL SURGERY

## 2021-09-21 PROCEDURE — 4004F PT TOBACCO SCREEN RCVD TLK: CPT | Performed by: NEUROLOGICAL SURGERY

## 2021-09-21 ASSESSMENT — ENCOUNTER SYMPTOMS
RESPIRATORY NEGATIVE: 1
GASTROINTESTINAL NEGATIVE: 1
EYES NEGATIVE: 1
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
## 2021-09-22 DIAGNOSIS — G43.009 MIGRAINE WITHOUT AURA AND WITHOUT STATUS MIGRAINOSUS, NOT INTRACTABLE: Primary | ICD-10-CM

## 2021-09-22 RX ORDER — UBROGEPANT 100 MG/1
TABLET ORAL
Qty: 30 TABLET | Refills: 1 | Status: SHIPPED | OUTPATIENT
Start: 2021-09-22 | End: 2022-03-22

## 2021-09-22 RX ORDER — UBROGEPANT 100 MG/1
TABLET ORAL
Qty: 30 TABLET | Refills: 0 | Status: CANCELLED | OUTPATIENT
Start: 2021-09-22

## 2021-09-28 RX ORDER — TIZANIDINE 4 MG/1
4 TABLET ORAL 3 TIMES DAILY PRN
Qty: 90 TABLET | Refills: 0 | Status: SHIPPED | OUTPATIENT
Start: 2021-09-28 | End: 2021-10-28

## 2021-09-28 NOTE — TELEPHONE ENCOUNTER
Silvana Alonzo has requested a refill on her medication. Last office visit : 9/21/2021   Next office visit : 9/15/2022   Last medication refill :9/1/2021 w/no rf's      Requested Prescriptions     Pending Prescriptions Disp Refills    tiZANidine (Aramis Kayden) 4 MG tablet [Pharmacy Med Name: TIZANIDINE HYDROCHLORIDE 4MG TABLET] 90 tablet 0     Sig: TAKE 1 TABLET BY MOUTH 3 TIMES DAILY AS NEEDED (MUSCLE SPASMS)       From 7/1/2021 office visit:  PLAN:  We have discussed and reviewed the results of the MRI brain, thoracic, and lumbar spine with Ms. Jodie Pelayo and her significant other at length. We explained that she does not have any neural element compression of the thoracic spine. She does have a spondylolisthesis of L5 on S1 that does result in some narrowing. Some descriptions of her pain are rather mechanical.  We will obtain additional imaging to rule out instability. We discussed various non-operative treatments that include LESI. We will move forward with imaging of her cervical spine due to the fact that she had significant narrowing on her imaging dated back in 2016.  -Start tizanidine    -Obtain MRI cervical spine   -Obtain XR lumbar spine to rule out instability  -Follow up 6 weeks     **Office note w/Dr Razia Barrientos on 9/21/21 notes PRN f/u for this patient.

## 2022-02-01 ENCOUNTER — TELEPHONE (OUTPATIENT)
Dept: NEUROSURGERY | Age: 47
End: 2022-02-01